# Patient Record
Sex: FEMALE | URBAN - METROPOLITAN AREA
[De-identification: names, ages, dates, MRNs, and addresses within clinical notes are randomized per-mention and may not be internally consistent; named-entity substitution may affect disease eponyms.]

---

## 2024-04-02 ENCOUNTER — OV NP (OUTPATIENT)
Dept: URBAN - METROPOLITAN AREA CLINIC 88 | Facility: CLINIC | Age: 29
End: 2024-04-02

## 2024-04-19 ENCOUNTER — LAB (OUTPATIENT)
Dept: URBAN - METROPOLITAN AREA CLINIC 88 | Facility: CLINIC | Age: 29
End: 2024-04-19

## 2024-04-19 ENCOUNTER — OV NP (OUTPATIENT)
Dept: URBAN - METROPOLITAN AREA CLINIC 88 | Facility: CLINIC | Age: 29
End: 2024-04-19
Payer: COMMERCIAL

## 2024-04-19 VITALS
HEIGHT: 60 IN | SYSTOLIC BLOOD PRESSURE: 159 MMHG | HEART RATE: 102 BPM | OXYGEN SATURATION: 98 % | BODY MASS INDEX: 57.52 KG/M2 | WEIGHT: 293 LBS | DIASTOLIC BLOOD PRESSURE: 104 MMHG | TEMPERATURE: 98.1 F

## 2024-04-19 DIAGNOSIS — K30 INDIGESTION: ICD-10-CM

## 2024-04-19 DIAGNOSIS — R10.10 UPPER ABDOMINAL PAIN: ICD-10-CM

## 2024-04-19 DIAGNOSIS — Z01.818 PRE-PROCEDURAL EXAMINATION: ICD-10-CM

## 2024-04-19 PROBLEM — 162031009: Status: ACTIVE | Noted: 2024-04-19

## 2024-04-19 PROCEDURE — 99204 OFFICE O/P NEW MOD 45 MIN: CPT | Performed by: NURSE PRACTITIONER

## 2024-04-19 RX ORDER — LOSARTAN POTASSIUM AND HYDROCHLOROTHIAZIDE 100; 12.5 MG/1; MG/1
1 TABLET TABLET, FILM COATED ORAL ONCE A DAY
Status: ACTIVE | COMMUNITY

## 2024-04-19 RX ORDER — AMLODIPINE BESYLATE 10 MG/1
1 TABLET TABLET ORAL ONCE A DAY
Status: ACTIVE | COMMUNITY

## 2024-04-19 RX ORDER — PANTOPRAZOLE SODIUM 40 MG/1
1 TABLET TABLET, DELAYED RELEASE ORAL
Qty: 30 | Refills: 1 | OUTPATIENT
Start: 2024-04-19

## 2024-04-19 NOTE — HPI-TODAY'S VISIT:
28 y.o. referred by Dr. aKla Robledo to schedule EGD prior to scheduling gastric restrictive surgery.  A copy of this note will be sent to the referring provider.  She is currently in the prerequisite phase of scheduling surgery.   Experiences increase post-prandial blasting and uncomfortable pressure to epigastric region.  Feels it is worse after consuming greasy foods.  Symptoms usually resolves on its own without use of medication.  Voices occasional indigestion with consumption of red meats and sauces.  Denies use of medication to manage occasional indigestion.   Defecation occurs daily and voices a complete sense of evacuation of stool.    Denies signs of melena, rectal bleeding, excessive use of NSAIDs or prior EGD procedure.

## 2024-04-25 ENCOUNTER — EGD (OUTPATIENT)
Dept: URBAN - METROPOLITAN AREA MEDICAL CENTER 42 | Facility: MEDICAL CENTER | Age: 29
End: 2024-04-25

## 2024-04-25 ENCOUNTER — OV NP (OUTPATIENT)
Dept: URBAN - METROPOLITAN AREA CLINIC 88 | Facility: CLINIC | Age: 29
End: 2024-04-25

## 2024-08-02 ENCOUNTER — TELEPHONE ENCOUNTER (OUTPATIENT)
Dept: URBAN - METROPOLITAN AREA CLINIC 88 | Facility: CLINIC | Age: 29
End: 2024-08-02

## 2024-08-02 RX ORDER — PANTOPRAZOLE SODIUM 40 MG/1
1 TABLET TABLET, DELAYED RELEASE ORAL
Qty: 90 | Refills: 0
Start: 2024-04-19

## 2024-08-05 ENCOUNTER — LAB OUTSIDE AN ENCOUNTER (OUTPATIENT)
Dept: URBAN - METROPOLITAN AREA CLINIC 88 | Facility: CLINIC | Age: 29
End: 2024-08-05

## 2024-08-20 ENCOUNTER — OFFICE VISIT (OUTPATIENT)
Dept: URBAN - METROPOLITAN AREA CLINIC 88 | Facility: CLINIC | Age: 29
End: 2024-08-20
Payer: COMMERCIAL

## 2024-08-20 ENCOUNTER — DASHBOARD ENCOUNTERS (OUTPATIENT)
Age: 29
End: 2024-08-20

## 2024-08-20 VITALS
HEART RATE: 106 BPM | BODY MASS INDEX: 57.52 KG/M2 | OXYGEN SATURATION: 97 % | DIASTOLIC BLOOD PRESSURE: 94 MMHG | TEMPERATURE: 97.5 F | HEIGHT: 60 IN | SYSTOLIC BLOOD PRESSURE: 147 MMHG | WEIGHT: 293 LBS

## 2024-08-20 DIAGNOSIS — K59.01 SLOW TRANSIT CONSTIPATION: ICD-10-CM

## 2024-08-20 DIAGNOSIS — K30 INDIGESTION: ICD-10-CM

## 2024-08-20 DIAGNOSIS — E66.8 EXTREME OBESITY: ICD-10-CM

## 2024-08-20 PROBLEM — 35298007: Status: ACTIVE | Noted: 2024-08-20

## 2024-08-20 PROBLEM — 414916001: Status: ACTIVE | Noted: 2024-08-20

## 2024-08-20 PROBLEM — 238136002: Status: ACTIVE | Noted: 2024-08-20

## 2024-08-20 PROCEDURE — 99213 OFFICE O/P EST LOW 20 MIN: CPT | Performed by: NURSE PRACTITIONER

## 2024-08-20 RX ORDER — AMLODIPINE BESYLATE 10 MG/1
1 TABLET TABLET ORAL ONCE A DAY
Status: ACTIVE | COMMUNITY

## 2024-08-20 RX ORDER — ONDANSETRON 4 MG/1
1 TABLET ON THE TONGUE AND ALLOW TO DISSOLVE TABLET, ORALLY DISINTEGRATING ORAL
Qty: 14 | Refills: 0 | OUTPATIENT
Start: 2024-08-20

## 2024-08-20 RX ORDER — PANTOPRAZOLE SODIUM 40 MG/1
1 TABLET TABLET, DELAYED RELEASE ORAL
Qty: 90 | Refills: 0

## 2024-08-20 RX ORDER — PANTOPRAZOLE SODIUM 40 MG/1
1 TABLET TABLET, DELAYED RELEASE ORAL
Qty: 90 | Refills: 0 | Status: ACTIVE | COMMUNITY
Start: 2024-04-19

## 2024-08-20 RX ORDER — LOSARTAN POTASSIUM AND HYDROCHLOROTHIAZIDE 100; 12.5 MG/1; MG/1
1 TABLET TABLET, FILM COATED ORAL ONCE A DAY
Status: ACTIVE | COMMUNITY

## 2024-08-20 NOTE — HPI-OTHER HISTORIES
--------------------------------------------------------------- Office note 04/19/2024: 28 y.o. referred by Dr. Kala Robledo to schedule EGD prior to scheduling gastric restrictive surgery. A copy of this note will be sent to the referring provider. She is currently in the prerequisite phase of scheduling surgery. Experiences increase post-prandial blasting and uncomfortable pressure to epigastric region. Feels it is worse after consuming greasy foods. Symptoms usually resolves on its own without use of medication. Voices occasional indigestion with consumption of red meats and sauces. Denies use of medication to manage occasional indigestion. Defecation occurs daily and voices a complete sense of evacuation of stool.    Denies signs of melena, rectal bleeding, excessive use of NSAIDs or prior EGD procedure.

## 2024-08-20 NOTE — HPI-TODAY'S VISIT:
Patient presents today as a follow-up from recent EGD procedure by Dr. Sandoval on 04/25/2024.  Findings:  normal esophagus, antral gastritis (neg h. pylori) and normal duodenum.  Since procedure, has been able to identify dietary triggers.that can affect her upper abdominal discomfort (i.e. alcoholic beverages, tomato based products).   Nausea occurs in "spells".  Requesting a prescription for ondansetron to take as needed.   Defecation daily but states may not experience a complete sense of evacuation of stool.    Decided to postpone pursing gastric restrictive surgery. show

## 2024-11-20 ENCOUNTER — OFFICE VISIT (OUTPATIENT)
Dept: URBAN - METROPOLITAN AREA CLINIC 88 | Facility: CLINIC | Age: 29
End: 2024-11-20
Payer: COMMERCIAL

## 2024-11-20 VITALS
HEART RATE: 101 BPM | OXYGEN SATURATION: 95 % | HEIGHT: 60 IN | BODY MASS INDEX: 57.52 KG/M2 | TEMPERATURE: 97.4 F | WEIGHT: 293 LBS | DIASTOLIC BLOOD PRESSURE: 104 MMHG | SYSTOLIC BLOOD PRESSURE: 168 MMHG

## 2024-11-20 DIAGNOSIS — K21.9 GERD WITHOUT ESOPHAGITIS: ICD-10-CM

## 2024-11-20 DIAGNOSIS — K59.01 SLOW TRANSIT CONSTIPATION: ICD-10-CM

## 2024-11-20 DIAGNOSIS — E66.01 MORBID OBESITY: ICD-10-CM

## 2024-11-20 PROBLEM — 266435005: Status: ACTIVE | Noted: 2024-11-20

## 2024-11-20 PROCEDURE — 99213 OFFICE O/P EST LOW 20 MIN: CPT | Performed by: NURSE PRACTITIONER

## 2024-11-20 RX ORDER — LOSARTAN POTASSIUM AND HYDROCHLOROTHIAZIDE 100; 12.5 MG/1; MG/1
1 TABLET TABLET, FILM COATED ORAL ONCE A DAY
Status: ACTIVE | COMMUNITY

## 2024-11-20 RX ORDER — AMLODIPINE BESYLATE 10 MG/1
1 TABLET TABLET ORAL ONCE A DAY
Status: ACTIVE | COMMUNITY

## 2024-11-20 RX ORDER — PANTOPRAZOLE SODIUM 40 MG/1
1 TABLET TABLET, DELAYED RELEASE ORAL
Qty: 90 | Refills: 0 | Status: ACTIVE | COMMUNITY

## 2024-11-20 RX ORDER — ONDANSETRON 4 MG/1
1 TABLET ON THE TONGUE AND ALLOW TO DISSOLVE TABLET, ORALLY DISINTEGRATING ORAL
Qty: 14 | Refills: 1
Start: 2024-08-20

## 2024-11-20 RX ORDER — ONDANSETRON 4 MG/1
1 TABLET ON THE TONGUE AND ALLOW TO DISSOLVE TABLET, ORALLY DISINTEGRATING ORAL
Qty: 14 | Refills: 0 | Status: ACTIVE | COMMUNITY
Start: 2024-08-20

## 2024-11-20 RX ORDER — PANTOPRAZOLE SODIUM 40 MG/1
1 TABLET TABLET, DELAYED RELEASE ORAL
Qty: 90 | Refills: 1

## 2024-11-20 NOTE — HPI-OTHER HISTORIES
- - - - - - - - - - - - - - - - - - - - - - - - - - Office note 08/20/2024: Patient presents today as a follow-up from recent EGD procedure by Dr. Sandoval on 04/25/2024. Findings: normal esophagus, antral gastritis (neg h. pylori) and normal duodenum. Since procedure, has been able to identify dietary triggers.that can affect her upper abdominal discomfort (i.e. alcoholic beverages, tomato based products). Nausea occurs in "spells". Requesting a prescription for ondansetron to take as needed. Defecation daily but states may not experience a complete sense of evacuation of stool.  Decided to postpone pursing gastric restrictive surgery.

## 2024-11-20 NOTE — HPI-TODAY'S VISIT:
Presents today for follow up regarding GERD.  She remains on pantoprazole daily with adequate control of dyspepsia/GERD voiced.  Missing more than 2 days has lead to return in epigastric discomfort.   Continues to voice c/o intermittent nausea.  She denies this being a daily complaint.  Typically, nausea increases after eating red sauces or drinking red flavored drinks.  Taking Zofran as needed does help to resolve this complaint.  Defecation occurs daily but still fails to experience a complete sense of evacuation of stool.  Complete evacuation of stool occurs aout 1-2 times a week.    PCP is in the process of prescribing GLP-1 medications for weight loss.

## 2024-11-27 ENCOUNTER — OFFICE VISIT (OUTPATIENT)
Dept: URBAN - METROPOLITAN AREA CLINIC 88 | Facility: CLINIC | Age: 29
End: 2024-11-27

## 2025-02-18 ENCOUNTER — OFFICE VISIT (OUTPATIENT)
Dept: URBAN - METROPOLITAN AREA CLINIC 88 | Facility: CLINIC | Age: 30
End: 2025-02-18
Payer: COMMERCIAL

## 2025-02-18 VITALS
OXYGEN SATURATION: 97 % | WEIGHT: 293 LBS | HEART RATE: 116 BPM | TEMPERATURE: 97.6 F | BODY MASS INDEX: 57.52 KG/M2 | DIASTOLIC BLOOD PRESSURE: 89 MMHG | SYSTOLIC BLOOD PRESSURE: 138 MMHG | HEIGHT: 60 IN

## 2025-02-18 DIAGNOSIS — K59.04 CHRONIC IDIOPATHIC CONSTIPATION: ICD-10-CM

## 2025-02-18 DIAGNOSIS — E66.01 MORBID OBESITY: ICD-10-CM

## 2025-02-18 DIAGNOSIS — K21.9 GERD WITHOUT ESOPHAGITIS: ICD-10-CM

## 2025-02-18 PROBLEM — 82934008: Status: ACTIVE | Noted: 2025-02-18

## 2025-02-18 PROCEDURE — 99213 OFFICE O/P EST LOW 20 MIN: CPT | Performed by: NURSE PRACTITIONER

## 2025-02-18 RX ORDER — TIRZEPATIDE 2.5 MG/.5ML
0.5 ML INJECTION, SOLUTION SUBCUTANEOUS
Status: ACTIVE | COMMUNITY

## 2025-02-18 RX ORDER — PANTOPRAZOLE SODIUM 40 MG/1
1 TABLET TABLET, DELAYED RELEASE ORAL
OUTPATIENT

## 2025-02-18 RX ORDER — AMLODIPINE BESYLATE 10 MG/1
1 TABLET TABLET ORAL ONCE A DAY
Status: ACTIVE | COMMUNITY

## 2025-02-18 RX ORDER — ONDANSETRON 4 MG/1
1 TABLET ON THE TONGUE AND ALLOW TO DISSOLVE TABLET, ORALLY DISINTEGRATING ORAL
Qty: 14 | Refills: 1 | Status: ACTIVE | COMMUNITY
Start: 2024-08-20

## 2025-02-18 RX ORDER — PANTOPRAZOLE SODIUM 40 MG/1
1 TABLET TABLET, DELAYED RELEASE ORAL
Qty: 90 | Refills: 1 | Status: ACTIVE | COMMUNITY

## 2025-02-18 RX ORDER — LOSARTAN POTASSIUM AND HYDROCHLOROTHIAZIDE 100; 12.5 MG/1; MG/1
1 TABLET TABLET, FILM COATED ORAL ONCE A DAY
Status: ACTIVE | COMMUNITY

## 2025-02-18 RX ORDER — LINACLOTIDE 145 UG/1
1 CAPSULE AT LEAST 30 MINUTES BEFORE A MEAL CAPSULE, GELATIN COATED ORAL
Qty: 90 | Refills: 3 | OUTPATIENT
Start: 2025-02-18 | End: 2026-02-13

## 2025-02-18 NOTE — HPI-TODAY'S VISIT:
Patient presents today for follow up regarding constipation.  She was provided samples of Linzess 72 and 145 mcg at LOV.  States that both doses were effective in improving her constipation.  Experienced more of complete sense of evacuation of stool while taking these samples.  Currently, defecation occurs daily but fails to experience a complete sense of evacuation of stool. Remains on pantoprazole daily to mangage GERD.  Started Zepbound in December 2024 with minimal side effects voiced.

## 2025-02-18 NOTE — HPI-OTHER HISTORIES
- - - - - - - - - - - - - - - - - - - - - - - - - - - - - - Office note 11/20/2024: Presents today for follow up regarding GERD.  She remains on pantoprazole daily with adequate control of dyspepsia/GERD voiced.  Missing more than 2 days has lead to return in epigastric discomfort.   Continues to voice c/o intermittent nausea. She denies this being a daily complaint. Typically, nausea increases after eating red sauces or drinking red flavored drinks.  Taking Zofran as needed does help to resolve this complaint.  Defecation occurs daily but still fails to experience a complete sense of evacuation of stool.  Complete evacuation of stool occurs aout 1-2 times a week.    PCP is in the process of prescribing GLP-1 medications for weight loss.

## 2025-06-03 ENCOUNTER — OFFICE VISIT (OUTPATIENT)
Dept: URBAN - METROPOLITAN AREA CLINIC 88 | Facility: CLINIC | Age: 30
End: 2025-06-03
Payer: COMMERCIAL

## 2025-06-03 DIAGNOSIS — K21.9 GERD WITHOUT ESOPHAGITIS: ICD-10-CM

## 2025-06-03 DIAGNOSIS — E66.01 MORBID OBESITY: ICD-10-CM

## 2025-06-03 DIAGNOSIS — K59.04 CHRONIC IDIOPATHIC CONSTIPATION: ICD-10-CM

## 2025-06-03 PROCEDURE — 99213 OFFICE O/P EST LOW 20 MIN: CPT | Performed by: NURSE PRACTITIONER

## 2025-06-03 RX ORDER — PANTOPRAZOLE SODIUM 40 MG/1
1 TABLET TABLET, DELAYED RELEASE ORAL
Status: ACTIVE | COMMUNITY

## 2025-06-03 RX ORDER — LINACLOTIDE 145 UG/1
1 CAPSULE AT LEAST 30 MINUTES BEFORE A MEAL CAPSULE, GELATIN COATED ORAL
Qty: 90 | Refills: 3 | Status: ACTIVE | COMMUNITY
Start: 2025-02-18 | End: 2026-02-13

## 2025-06-03 RX ORDER — TIRZEPATIDE 7.5 MG/.5ML
0.5 ML INJECTION, SOLUTION SUBCUTANEOUS
Status: ACTIVE | COMMUNITY

## 2025-06-03 RX ORDER — AMLODIPINE BESYLATE 10 MG/1
1 TABLET TABLET ORAL ONCE A DAY
Status: ACTIVE | COMMUNITY

## 2025-06-03 RX ORDER — ONDANSETRON 4 MG/1
1 TABLET ON THE TONGUE AND ALLOW TO DISSOLVE TABLET, ORALLY DISINTEGRATING ORAL
Qty: 14 | Refills: 1 | Status: ACTIVE | COMMUNITY
Start: 2024-08-20

## 2025-06-03 RX ORDER — PANTOPRAZOLE SODIUM 40 MG/1
1 TABLET TABLET, DELAYED RELEASE ORAL
OUTPATIENT
Start: 2025-06-02

## 2025-06-03 RX ORDER — LINACLOTIDE 145 UG/1
1 CAPSULE AT LEAST 30 MINUTES BEFORE A MEAL CAPSULE, GELATIN COATED ORAL
OUTPATIENT
Start: 2025-02-18

## 2025-06-03 RX ORDER — LOSARTAN POTASSIUM AND HYDROCHLOROTHIAZIDE 100; 12.5 MG/1; MG/1
1 TABLET TABLET, FILM COATED ORAL ONCE A DAY
Status: ACTIVE | COMMUNITY

## 2025-06-03 NOTE — HPI-OTHER HISTORIES
----------------------------------------------------------- Office note 02/18/2025: Patient presents today for follow up regarding constipation. She was provided samples of Linzess 72 and 145 mcg at LOV. States that both doses were effective in improving her constipation. Experienced more of complete sense of evacuation of stool while taking these samples. Currently, defecation occurs daily but fails to experience a complete sense of evacuation of stool. Remains on pantoprazole daily to mangage GERD.  Started Zepbound in December 2024 with minimal side effects voiced.

## 2025-06-03 NOTE — HPI-TODAY'S VISIT:
Patient presents today for follow up regarding CIC.  She is currently on Linzess 145 mcg with defecation occuring daily.  She remains on Zepbound and has tolerated medication well.  Scheduled to increase to 7.5 mg/week next week.   Takes pantoprazole as needed to manage GERD. Overall, feels symptoms are controlled.

## 2025-06-03 NOTE — PHYSICAL EXAM GASTROINTESTINAL
Abdomen , soft, nontender, nondistended , no guarding or rigidity , no masses palpable , normal bowel sounds , Liver and Spleen:  no hepatosplenomegaly
show